# Patient Record
Sex: MALE | Race: WHITE | URBAN - METROPOLITAN AREA
[De-identification: names, ages, dates, MRNs, and addresses within clinical notes are randomized per-mention and may not be internally consistent; named-entity substitution may affect disease eponyms.]

---

## 2021-05-20 ENCOUNTER — OFFICE VISIT (OUTPATIENT)
Dept: URBAN - METROPOLITAN AREA CLINIC 91 | Facility: CLINIC | Age: 71
End: 2021-05-20
Payer: MEDICARE

## 2021-05-20 DIAGNOSIS — H02.835 DERMATOCHALASIS OF LEFT LOWER EYELID: ICD-10-CM

## 2021-05-20 PROCEDURE — 99204 OFFICE O/P NEW MOD 45 MIN: CPT | Performed by: SPECIALIST

## 2021-05-20 ASSESSMENT — INTRAOCULAR PRESSURE
OD: 16
OS: 15

## 2021-05-20 NOTE — IMPRESSION/PLAN
Impression: Dermatochalasis of left lower eyelid: H02.835. Plan: Drooping upper eyelids were discussed with patient. Patient would like to proceed.

## 2021-06-24 ENCOUNTER — OFFICE VISIT (OUTPATIENT)
Dept: URBAN - METROPOLITAN AREA CLINIC 91 | Facility: CLINIC | Age: 71
End: 2021-06-24
Payer: MEDICARE

## 2021-06-24 DIAGNOSIS — H02.831 DERMATOCHALASIS OF RIGHT UPPER EYELID: ICD-10-CM

## 2021-06-24 DIAGNOSIS — H02.834 DERMATOCHALASIS OF LEFT UPPER EYELID: Primary | ICD-10-CM

## 2021-06-24 PROCEDURE — 92081 LIMITED VISUAL FIELD XM: CPT | Performed by: SPECIALIST

## 2021-06-24 PROCEDURE — 99213 OFFICE O/P EST LOW 20 MIN: CPT | Performed by: SPECIALIST

## 2021-06-24 PROCEDURE — 92285 EXTERNAL OCULAR PHOTOGRAPHY: CPT | Performed by: SPECIALIST

## 2021-06-24 RX ORDER — NEOMYCIN SULFATE, POLYMYXIN B SULFATE AND DEXAMETHASONE 3.5; 10000; 1 MG/G; [USP'U]/G; MG/G
OINTMENT OPHTHALMIC
Qty: 3.5 | Refills: 1 | Status: INACTIVE
Start: 2021-06-24 | End: 2021-07-26

## 2021-06-24 RX ORDER — CEPHALEXIN 250 MG/1
250 MG CAPSULE ORAL
Qty: 28 | Refills: 0 | Status: ACTIVE
Start: 2021-06-24

## 2021-06-24 NOTE — IMPRESSION/PLAN
Impression: Dermatochalasis of right upper eyelid: H02.831. Eyelid shows marginal reflex distance of 1mm which is affecting patients peripheral vision. Also the palpebral fissure in down gaze is measured to be only 1mm in height. The proposed procedure will more than likely improve these measurements and also improve patient's functional vision. Plan: Drooping upper eyelids were discussed with patient. The patient feels that upper lids are affecting their field of vision or daily life and would like to discuss surgery intervention. Risks, Benefits, and Alternatives explained to the patient including bleeding, scarring, asymmetric lids, bruising, loss of eye or vision, worsening of dry eye condition, poor cosmesis, and need for further surgery. Patient understands that they should be off blood thinner for 10-14 days prior to surgery. Patient agrees with all of these risks and the patient has opted to undergo surgery. After reviewing field test and external photos, dermatochalasis affects superior 40 degrees of vision and it improves with taping of the lids.  Due to the fact that testing shows improvement in fields of vision,  I recommended that patient move forward with blepharoplasty

## 2021-06-24 NOTE — IMPRESSION/PLAN
Impression: Dermatochalasis of left upper eyelid: H02.834. Eyelid shows marginal reflex distance of 1mm which is affecting patients peripheral vision. Also the palpebral fissure in down gaze is measured to be only 1mm in height. The proposed procedure will more than likely improve these measurements and also improve patient's functional vision. Plan: See plan above.

## 2021-07-28 ENCOUNTER — SURGERY (OUTPATIENT)
Dept: URBAN - METROPOLITAN AREA EXTERNAL CLINIC 49 | Facility: EXTERNAL CLINIC | Age: 71
End: 2021-07-28
Payer: MEDICARE

## 2021-08-10 ENCOUNTER — POST-OPERATIVE VISIT (OUTPATIENT)
Dept: URBAN - METROPOLITAN AREA CLINIC 91 | Facility: CLINIC | Age: 71
End: 2021-08-10
Payer: MEDICARE

## 2021-08-10 DIAGNOSIS — Z48.810 ENCOUNTER FOR SURGICAL AFTERCARE FOLLOWING SURGERY ON A SENSE ORGAN: Primary | ICD-10-CM

## 2021-08-10 PROCEDURE — 99024 POSTOP FOLLOW-UP VISIT: CPT | Performed by: SPECIALIST

## 2021-08-10 NOTE — IMPRESSION/PLAN
Impression: S/P Bleph, BUL OU - 13 Days. Encounter for surgical aftercare following surgery on a sense organ  Z48.810. Excellent post op course   Post operative instructions reviewed - Condition is improving - Bleph BUL. Plan: Bleph BUL, healing well, sutures removed BUL, well tolerated, recommend to d/c Maxitrol virgie.

## 2021-10-25 ENCOUNTER — OFFICE VISIT (OUTPATIENT)
Dept: URBAN - METROPOLITAN AREA CLINIC 91 | Facility: CLINIC | Age: 71
End: 2021-10-25
Payer: MEDICARE

## 2021-10-25 DIAGNOSIS — H52.4 PRESBYOPIA: ICD-10-CM

## 2021-10-25 DIAGNOSIS — H25.13 AGE-RELATED NUCLEAR CATARACT, BILATERAL: Primary | ICD-10-CM

## 2021-10-25 PROCEDURE — 99212 OFFICE O/P EST SF 10 MIN: CPT | Performed by: SPECIALIST

## 2021-10-25 ASSESSMENT — VISUAL ACUITY
OS: 20/25
OD: 20/25

## 2021-10-25 ASSESSMENT — INTRAOCULAR PRESSURE
OD: 13
OS: 12

## 2025-06-09 ENCOUNTER — OFFICE VISIT (OUTPATIENT)
Facility: LOCATION | Age: 75
End: 2025-06-09
Payer: MEDICARE

## 2025-06-09 DIAGNOSIS — H15.102 UNSPECIFIED EPISCLERITIS, LEFT EYE: Primary | ICD-10-CM

## 2025-06-09 PROCEDURE — 99213 OFFICE O/P EST LOW 20 MIN: CPT | Performed by: SPECIALIST

## 2025-06-09 RX ORDER — NEOMYCIN SULFATE, POLYMYXIN B SULFATE AND DEXAMETHASONE 1; 3.5; 1 MG/ML; MG/ML; [USP'U]/ML
SUSPENSION OPHTHALMIC
Qty: 10 | Refills: 0 | Status: ACTIVE
Start: 2025-06-09

## 2025-06-09 ASSESSMENT — INTRAOCULAR PRESSURE
OD: 13
OS: 11

## 2025-06-16 ENCOUNTER — OFFICE VISIT (OUTPATIENT)
Facility: LOCATION | Age: 75
End: 2025-06-16
Payer: MEDICARE

## 2025-06-16 DIAGNOSIS — H15.102 UNSPECIFIED EPISCLERITIS, LEFT EYE: Primary | ICD-10-CM

## 2025-06-16 DIAGNOSIS — H25.13 AGE-RELATED NUCLEAR CATARACT, BILATERAL: ICD-10-CM

## 2025-06-16 PROCEDURE — 99213 OFFICE O/P EST LOW 20 MIN: CPT | Performed by: SPECIALIST

## 2025-06-16 ASSESSMENT — INTRAOCULAR PRESSURE
OS: 15
OD: 15